# Patient Record
Sex: FEMALE | Race: WHITE | NOT HISPANIC OR LATINO | Employment: STUDENT | URBAN - METROPOLITAN AREA
[De-identification: names, ages, dates, MRNs, and addresses within clinical notes are randomized per-mention and may not be internally consistent; named-entity substitution may affect disease eponyms.]

---

## 2017-06-21 ENCOUNTER — TELEPHONE (OUTPATIENT)
Dept: DERMATOLOGY | Age: 22
End: 2017-06-21

## 2017-06-21 DIAGNOSIS — L70.0 ACNE VULGARIS: Primary | ICD-10-CM

## 2017-06-23 RX ORDER — CLINDAMYCIN PHOSPHATE 11.9 MG/ML
SOLUTION TOPICAL DAILY
Qty: 30 ML | Refills: 11 | Status: SHIPPED | OUTPATIENT
Start: 2017-06-23 | End: 2017-09-18 | Stop reason: ALTCHOICE

## 2017-09-18 ENCOUNTER — TELEPHONE (OUTPATIENT)
Dept: DERMATOLOGY | Age: 22
End: 2017-09-18

## 2017-09-18 DIAGNOSIS — L70.8 OTHER ACNE: ICD-10-CM

## 2017-09-18 RX ORDER — CLINDAMYCIN PHOSPHATE, BENZOYL PEROXIDE 25; 10 MG/G; MG/G
GEL TOPICAL
Qty: 45 G | Refills: 1 | Status: SHIPPED | OUTPATIENT
Start: 2017-09-18

## 2017-09-21 ENCOUNTER — TELEPHONE (OUTPATIENT)
Dept: DERMATOLOGY | Age: 22
End: 2017-09-21

## 2017-12-20 ENCOUNTER — OFFICE VISIT (OUTPATIENT)
Dept: OBGYN | Age: 22
End: 2017-12-20

## 2017-12-20 VITALS
WEIGHT: 109 LBS | SYSTOLIC BLOOD PRESSURE: 112 MMHG | BODY MASS INDEX: 19.31 KG/M2 | HEIGHT: 63 IN | DIASTOLIC BLOOD PRESSURE: 58 MMHG

## 2017-12-20 DIAGNOSIS — Z01.419 WELL WOMAN EXAM WITH ROUTINE GYNECOLOGICAL EXAM: Primary | ICD-10-CM

## 2017-12-20 DIAGNOSIS — Z12.4 PAP SMEAR FOR CERVICAL CANCER SCREENING: ICD-10-CM

## 2017-12-20 DIAGNOSIS — Z30.41 SURVEILLANCE OF PREVIOUSLY PRESCRIBED CONTRACEPTIVE PILL: ICD-10-CM

## 2017-12-20 DIAGNOSIS — Z11.3 SCREENING EXAMINATION FOR STD (SEXUALLY TRANSMITTED DISEASE): ICD-10-CM

## 2017-12-20 PROCEDURE — 88175 CYTOPATH C/V AUTO FLUID REDO: CPT | Performed by: PATHOLOGY

## 2017-12-20 PROCEDURE — 99395 PREV VISIT EST AGE 18-39: CPT | Performed by: OBSTETRICS & GYNECOLOGY

## 2017-12-20 PROCEDURE — 87491 CHLMYD TRACH DNA AMP PROBE: CPT | Performed by: INTERNAL MEDICINE

## 2017-12-20 PROCEDURE — 87624 HPV HI-RISK TYP POOLED RSLT: CPT | Performed by: PATHOLOGY

## 2017-12-20 PROCEDURE — 87591 N.GONORRHOEAE DNA AMP PROB: CPT | Performed by: INTERNAL MEDICINE

## 2017-12-20 RX ORDER — NORETHINDRONE ACETATE AND ETHINYL ESTRADIOL 1MG-20(21)
1 KIT ORAL DAILY
Qty: 84 TABLET | Refills: 4 | Status: SHIPPED | OUTPATIENT
Start: 2017-12-20

## 2017-12-21 ENCOUNTER — TELEPHONE (OUTPATIENT)
Dept: OBGYN | Age: 22
End: 2017-12-21

## 2017-12-21 LAB
C TRACH RRNA SPEC QL NAA+PROBE: NEGATIVE
N GONORRHOEA RRNA SPEC QL NAA+PROBE: NEGATIVE
SPECIMEN SOURCE: NORMAL

## 2017-12-28 ENCOUNTER — TELEPHONE (OUTPATIENT)
Dept: OBGYN | Age: 22
End: 2017-12-28

## 2017-12-28 LAB — CYTOLOGY CVX/VAG DOC THIN PREP: NORMAL

## 2018-02-27 ENCOUNTER — OFFICE VISIT (OUTPATIENT)
Dept: DERMATOLOGY | Facility: CLINIC | Age: 23
End: 2018-02-27
Payer: COMMERCIAL

## 2018-02-27 DIAGNOSIS — Z12.83 SKIN CANCER SCREENING: ICD-10-CM

## 2018-02-27 DIAGNOSIS — D22.9 MULTIPLE BENIGN NEVI: ICD-10-CM

## 2018-02-27 DIAGNOSIS — L70.0 ACNE VULGARIS: Primary | ICD-10-CM

## 2018-02-27 RX ORDER — TRETINOIN 0.5 MG/G
CREAM TOPICAL AT BEDTIME
COMMUNITY
End: 2018-02-27

## 2018-02-27 RX ORDER — CLINDAMYCIN PHOSPHATE, BENZOYL PEROXIDE 25; 10 MG/G; MG/G
GEL TOPICAL
COMMUNITY
End: 2018-02-27

## 2018-02-27 RX ORDER — CLINDAMYCIN PHOSPHATE, BENZOYL PEROXIDE 25; 10 MG/G; MG/G
GEL TOPICAL
Qty: 50 G | Refills: 1 | Status: SHIPPED | OUTPATIENT
Start: 2018-02-27 | End: 2020-03-30

## 2018-02-27 RX ORDER — TRETINOIN 0.5 MG/G
CREAM TOPICAL AT BEDTIME
Qty: 45 G | Refills: 1 | Status: SHIPPED | OUTPATIENT
Start: 2018-02-27 | End: 2020-03-30

## 2018-02-27 RX ORDER — NORETHINDRONE ACETATE AND ETHINYL ESTRADIOL AND FERROUS FUMARATE 5-7-9-7
1 KIT ORAL DAILY
COMMUNITY
End: 2023-05-26

## 2018-02-27 ASSESSMENT — PAIN SCALES - GENERAL: PAINLEVEL: NO PAIN (0)

## 2018-02-27 NOTE — LETTER
2/27/2018       RE: Bridgett Robin  [address]  [facility] [address]     Dear Colleague,    Thank you for referring your patient, Bridgett Robin, to the [facility] DERMATOLOGY at [facility]. Please see a copy of my visit note below.    [facility] Dermatology Note      Dermatology Problem List:  1.Acne Vulgaris - well controlled on tretinoin 0.05% cream QHS and clinda/BP gel QAM   -patient also on OCPs    CC:   Chief Complaint   Patient presents with     Acne     Bridgett is here today to be seen for acne.        Encounter Date: Feb 27, 2018    History of Present Illness:  Ms. Bridgett Robin is a 23 year old female who in new to us with no history of skin cancer presents for a skin check. She also needs her acne medication refilled. She has no lesions of concern. The patient denies painful, itching, tingling or bleeding lesions unless otherwise noted. Says he has fair skin and realizes she is at risk for skin cancer.    Concerning her acne she just needs refills, it is working very well to control her breakouts and she is happy with it. Using clindamycin/BP gel in the AM and tretinoin 0.05% in the PM daily. She states she uses sunscreen daily, as high of an SPF she can get because she burns so easily.     Past Medical History:   There is no problem list on file for this patient.    History reviewed. No pertinent past medical history.  History reviewed. No pertinent surgical history.    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 The patient denies use of tanning beds.    Family History:  There is no family history of melanoma.    Medications:  Current Outpatient Prescriptions   Medication Sig Dispense Refill     norethindrone-ethinyl estradiol-iron (ESTROSTEP FE) 1-20/1-30/1-35 MG-MCG per tablet Take 1 tablet by mouth daily       Clindamycin Phos-benzoyl Perox 1.2-2.5 % GEL        tretinoin  (RETIN-A) 0.05 % cream Apply topically At Bedtime       No Known Allergies      Review of Systems:  -As per HPI  -Constitutional: The patient denies fatigue, fevers, chills, unintended weight loss, and night sweats.  -Skin: As above in HPI. No additional skin concerns.    Physical exam:  Vitals: There were no vitals taken for this visit.  GEN: This is a well developed, well-nourished female in no acute distress, in a pleasant mood.    SKIN: Total skin excluding the undergarment areas was performed. The exam included the head/face, neck, both arms, chest, back, abdomen, both legs, digits and/or nails.   -Juan skin type II  -There are superifical acneiform papules with intermixed open and closed comedones on the chin and forehead, very mild, very well controlled on current medications.   Multiple regular brown pigmented macules and papules are identified on the trunka nd extremities, fewer than average number of nevi.   -No other lesions of concern on areas examined.     Impression/Plan:  1. Acne vulgaris- well controlled. She is also taking oral birth control pills which is also probably having a positive effect.    Tretinoin 0.05% cream refilled    Clindamycin Phos-Benzoyl Perox 1.2-2.5% gel refilled    Continue with adequate sun protection    2. Skin Cancer Screening    ABCDs of melanoma were discussed and self skin checks were advised. , Sun precaution was advised including the use of sun screens of SPF 30 or higher, sun protective clothing, and avoidance of tanning beds., We will clinically monitor this area.    3. Multiple clinically benign nevi on the trunk and extremities    ABCDs of melanoma were discussed and self skin checks were advised. , Sun precaution was advised including the use of sun screens of SPF 30 or higher, sun protective clothing, and avoidance of tanning beds., We will clinically monitor this area.    CC Dr. Herrera on close of this encounter.  Follow-up prn for new or changing lesions.         Staff Involved:  Staff Only  All risks, benefits and alternatives were discussed with patient.  Patient is in agreement and understands the assessment and plan.  All questions were answered.    Skylar Melendez PA-C  [facility] [place]: Phone: [phone], Fax: [phone]

## 2018-02-27 NOTE — MR AVS SNAPSHOT
After Visit Summary   2/27/2018    Bridgett Robin    MRN: [id number]           Patient Information     Date Of Birth          1995        Visit Information        Provider Department      2/27/2018 11:00 AM Skylar Melendez PA-C [facility] Dermatology        Today's Diagnoses     Acne vulgaris    -  1    Skin cancer screening        Multiple benign nevi           Follow-ups after your visit        Follow-up notes from your care team     Return in about 1 year (around 2/27/2019).      Who to contact     Please call your clinic at [phone] to:    Ask questions about your health    Make or cancel appointments    Discuss your medicines    Learn about your test results    Speak to your doctor            Additional Information About Your Visit        cityguruharBuyer[employer]S Information     Zipcar gives you secure access to your electronic health record. If you see a primary care provider, you can also send messages to your care team and make appointments. If you have questions, please call your primary care clinic.  If you do not have a primary care provider, please call [phone] and they will assist you.      Zipcar is an electronic gateway that provides easy, online access to your medical records. With [employer], you can request a clinic appointment, read your test results, renew a prescription or communicate with your care team.     To access your existing account, please contact your [facility] or call [phone] for assistance.        Care EveryWhere ID     This is your Care EveryWhere ID. This could be used by other organizations to access your [place] medical records  [id number]         Blood Pressure from Last 3 Encounters:   No data found for BP    Weight from Last 3 Encounters:   No data found for Wt              Today, you had the following     No orders found for display         Today's Medication Changes          These changes are accurate as of 2/27/18 11:23 AM.   If you have any questions, ask your nurse or doctor.               These medicines have changed or have updated prescriptions.        Dose/Directions    Clindamycin Phos-benzoyl Perox 1.2-2.5 % Gel   This may have changed:    - how to take this  - additional instructions   Used for:  Acne vulgaris   Changed by:  Skylar Melendez PA-C        Apply topically daily   Quantity:  50 g   Refills:  1            Where to get your medicines      These medications were sent to [facility] - [place], [place] - [address]  [address] , [facility] [address]     Phone:  [phone]     Clindamycin Phos-benzoyl Perox 1.2-2.5 % Gel    tretinoin 0.05 % cream                Primary Care Provider    None Specified       No primary provider on file.        Equal Access to Services     [facility] : Lior Blood, waaxda luqadaha, qaybta kaalmada magdalenayaparesh, savage sims . So [facility] [phone].    ATENCIÓN: Si habla español, tiene a pinedo disposición servicios gratuitos de asistencia lingüística. [facility] [phone].    We comply with applicable federal civil rights laws and [place] laws. We do not discriminate on the basis of race, color, national origin, age, disability, sex, sexual orientation, or gender identity.            Thank you!     Thank you for choosing [facility] DERMATOLOGY  for your care. Our goal is always to provide you with excellent care. Hearing back from our patients is one way we can continue to improve our services. Please take a few minutes to complete the written survey that you may receive in the mail after your visit with us. Thank you!             Your Updated Medication List - Protect others around you: Learn how to safely use, store and throw away your medicines at www.disposemymeds.org.          This list is accurate as of 2/27/18 11:23 AM.  Always use your most recent med list.                   Brand Name Dispense Instructions for use  Diagnosis    Clindamycin Phos-benzoyl Perox 1.2-2.5 % Gel     50 g    Apply topically daily    Acne vulgaris       norethindrone-ethinyl estradiol-iron 1-20/1-30/1-35 MG-MCG per tablet    ESTROSTEP FE     Take 1 tablet by mouth daily        tretinoin 0.05 % cream    RETIN-A    45 g    Apply topically At Bedtime    Acne vulgaris

## 2018-02-27 NOTE — NURSING NOTE
Dermatology Rooming Note    Bridgett Robin's goals for this visit include:   Chief Complaint   Patient presents with     Acne     Bridgett is here today to be seen for acne.      Michelle Lieberman MA

## 2018-02-27 NOTE — PROGRESS NOTES
[facility] Dermatology Note      Dermatology Problem List:  1.Acne Vulgaris - well controlled on tretinoin 0.05% cream QHS and clinda/BP gel QAM   -patient also on OCPs    CC:   Chief Complaint   Patient presents with     Acne     Bridgett is here today to be seen for acne.        Encounter Date: Feb 27, 2018    History of Present Illness:  Ms. Bridgett Robin is a 23 year old female who in new to us with no history of skin cancer presents for a skin check. She also needs her acne medication refilled. She has no lesions of concern. The patient denies painful, itching, tingling or bleeding lesions unless otherwise noted. Says he has fair skin and realizes she is at risk for skin cancer.    Concerning her acne she just needs refills, it is working very well to control her breakouts and she is happy with it. Using clindamycin/BP gel in the AM and tretinoin 0.05% in the PM daily. She states she uses sunscreen daily, as high of an SPF she can get because she burns so easily.     Past Medical History:   There is no problem list on file for this patient.    History reviewed. No pertinent past medical history.  History reviewed. No pertinent surgical history.    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 The patient denies use of tanning beds.    Family History:  There is no family history of melanoma.    Medications:  Current Outpatient Prescriptions   Medication Sig Dispense Refill     norethindrone-ethinyl estradiol-iron (ESTROSTEP FE) 1-20/1-30/1-35 MG-MCG per tablet Take 1 tablet by mouth daily       Clindamycin Phos-benzoyl Perox 1.2-2.5 % GEL        tretinoin (RETIN-A) 0.05 % cream Apply topically At Bedtime       No Known Allergies      Review of Systems:  -As per HPI  -Constitutional: The patient denies fatigue, fevers, chills, unintended weight loss, and night sweats.  -Skin: As above in HPI. No additional skin  concerns.    Physical exam:  Vitals: There were no vitals taken for this visit.  GEN: This is a well developed, well-nourished female in no acute distress, in a pleasant mood.    SKIN: Total skin excluding the undergarment areas was performed. The exam included the head/face, neck, both arms, chest, back, abdomen, both legs, digits and/or nails.   -Juan skin type II  -There are superifical acneiform papules with intermixed open and closed comedones on the chin and forehead, very mild, very well controlled on current medications.   Multiple regular brown pigmented macules and papules are identified on the trunka nd extremities, fewer than average number of nevi.   -No other lesions of concern on areas examined.     Impression/Plan:  1. Acne vulgaris- well controlled. She is also taking oral birth control pills which is also probably having a positive effect.    Tretinoin 0.05% cream refilled    Clindamycin Phos-Benzoyl Perox 1.2-2.5% gel refilled    Continue with adequate sun protection    2. Skin Cancer Screening    ABCDs of melanoma were discussed and self skin checks were advised. , Sun precaution was advised including the use of sun screens of SPF 30 or higher, sun protective clothing, and avoidance of tanning beds., We will clinically monitor this area.    3. Multiple clinically benign nevi on the trunk and extremities    ABCDs of melanoma were discussed and self skin checks were advised. , Sun precaution was advised including the use of sun screens of SPF 30 or higher, sun protective clothing, and avoidance of tanning beds., We will clinically monitor this area.    CC Dr. Herrera on close of this encounter.  Follow-up prn for new or changing lesions.        Staff Involved:  Staff Only  All risks, benefits and alternatives were discussed with patient.  Patient is in agreement and understands the assessment and plan.  All questions were answered.    Skylar Melendez PA-C  [facility]  [facility]: Phone: [phone], Fax: [phone]

## 2018-03-05 ENCOUNTER — HEALTH MAINTENANCE LETTER (OUTPATIENT)
Age: 23
End: 2018-03-05

## 2019-03-01 ENCOUNTER — TELEPHONE (OUTPATIENT)
Dept: OBGYN | Age: 24
End: 2019-03-01

## 2019-03-05 DIAGNOSIS — Z30.41 SURVEILLANCE OF PREVIOUSLY PRESCRIBED CONTRACEPTIVE PILL: ICD-10-CM

## 2019-03-05 RX ORDER — NORETHINDRONE ACETATE AND ETHINYL ESTRADIOL AND FERROUS FUMARATE 1MG-20(21)
KIT ORAL
Qty: 84 TABLET | Refills: 4 | OUTPATIENT
Start: 2019-03-05

## 2019-03-11 ENCOUNTER — TELEPHONE (OUTPATIENT)
Dept: OBGYN | Age: 24
End: 2019-03-11

## 2020-03-11 ENCOUNTER — HEALTH MAINTENANCE LETTER (OUTPATIENT)
Age: 25
End: 2020-03-11

## 2020-03-30 ENCOUNTER — VIRTUAL VISIT (OUTPATIENT)
Dept: DERMATOLOGY | Facility: CLINIC | Age: 25
End: 2020-03-30
Payer: COMMERCIAL

## 2020-03-30 DIAGNOSIS — L70.0 ACNE VULGARIS: ICD-10-CM

## 2020-03-30 RX ORDER — TRETINOIN 0.5 MG/G
CREAM TOPICAL AT BEDTIME
Qty: 45 G | Refills: 1 | Status: SHIPPED | OUTPATIENT
Start: 2020-03-30 | End: 2023-06-22

## 2020-03-30 RX ORDER — CLINDAMYCIN PHOSPHATE, BENZOYL PEROXIDE 25; 10 MG/G; MG/G
GEL TOPICAL
Qty: 50 G | Refills: 1 | Status: SHIPPED | OUTPATIENT
Start: 2020-03-30 | End: 2020-08-25

## 2020-03-30 ASSESSMENT — PAIN SCALES - GENERAL: PAINLEVEL: NO PAIN (0)

## 2020-03-30 NOTE — PATIENT INSTRUCTIONS
[facility] Teledermatology Visit    Thank you for allowing us to participate in your care. Your findings are consistent with acne. Your instructions are as follows:  1. Continue with current acne regimen - tretinoin and Acayna gel.   2. Please use proper sun protection - at least SPF 30+    Your follow-up instructions are as follows:  1. Follow-up with dermatology as needed              When should I call my doctor?    If you are worsening or not improving, please, contact us or seek urgent care as noted below.     Who should I call with questions?    [facility]: [phone]     [facility]: [phone]    If this is a medical emergency and you are unable to reach an ER, Call 609

## 2020-03-30 NOTE — PROGRESS NOTES
[facility]      Impression and Recommendations:  1. Acne vulgaris- well controlled. With mild hormonal flares. On OCP.    Tretinoin 0.05% cream refilled    Clindamycin Phos-Benzoyl Perox 1.2-2.5% gel refilled    Discussed effect of OCP. She could consider taking it continuously to mitigate the hormonal flares.    Briefly discussed isotretinoin and spironolactone as alternative options.    Continue with adequate sun protection    Follow-up:   PRN, sooner for new or changing lesions    Staff:    All risks, benefits and alternatives were discussed with patient.  Patient is in agreement and understands the assessment and plan.  All questions were answered.    Skylar Melendez PA-C, MPAS  [facility] [place]: Phone: [phone], Fax: [phone]  [facility]: Phone: [phone],  Fax: [phone]    _____________________________________________________________________________    Dermatology Problem List:  1.Acne Vulgaris - well controlled on tretinoin 0.05% cream QHS and clinda/BP gel QAM              -patient also on OCPs    Encounter Date: Mar 30, 2020    CC:   Chief Complaint   Patient presents with     Acne     Bridgett is following up on acne.        History of Present Illness:  Bridgett Robin is a 25 year old female who presents as a teledermatology consult for the following:    She was last seen by myself approximately 2 years ago. She was continued on tretinoin 0.05% cream and Acanya (clindamcyin/BPO) 1.2-2.5% gel. This regimen works very well to control her acne. She states she is relatively strict with it an does notice her acne flare if she runs out of the medication or stops using it. She is also on an OCP. She takes this as prescribed. She noticed a few small hormonal related breakouts before she gets her period each month. She is otherwise doing well and has no other concerns.     ROS:  Constitutional: no  fevers, night sweats or unintentional weight change   Integumentary: no concerning lesions or moles   Psych: no depression or anxiety, no sleep problems    Past Medical History:   There is no problem list on file for this patient.    No past medical history on file.  No past surgical history on file.    Social History:  Social History     Socioeconomic History     Marital status: Single     Spouse name: None     Number of children: None     Years of education: None     Highest education level: None   Occupational History     None   Social Needs     Financial resource strain: None     Food insecurity     Worry: None     Inability: None     Transportation needs     Medical: None     Non-medical: None   Tobacco Use     Smoking status: Never Smoker     Smokeless tobacco: Never Used   Substance and Sexual Activity     Alcohol use: None     Drug use: None     Sexual activity: None   Lifestyle     Physical activity     Days per week: None     Minutes per session: None     Stress: None   Relationships     Social connections     Talks on phone: None     Gets together: None     Attends Jewish service: None     Active member of club or organization: None     Attends meetings of clubs or organizations: None     Relationship status: None     Intimate partner violence     Fear of current or ex partner: None     Emotionally abused: None     Physically abused: None     Forced sexual activity: None   Other Topics Concern     Parent/sibling w/ CABG, MI or angioplasty before 65F 55M? Not Asked   Social History Narrative     None       Family History:  No family history on file.    Medications:  Current Outpatient Medications   Medication     Clindamycin Phos-benzoyl Perox 1.2-2.5 % GEL     norethindrone-ethinyl estradiol-iron (ESTROSTEP FE) 1-20/1-30/1-35 MG-MCG per tablet     tretinoin (RETIN-A) 0.05 % cream     No current facility-administered medications for this visit.           No Known  Allergies    _____________________________________________________________________________    Teledermatology information:  - Location of patient: Home  - Location of teledermatology provider: Skylar Melendez PA-C ([address] [place] [address])  - Reason teledermatology is appropriate: of National Emergency Regarding Coronavirus disease (COVID 19) Outbreak  - The patient's condition can safely be assessed using telemedicine: yes  - Method of transmission: store and forward teledermatology  - Service start time:3:20pm  - Service end time:3:27pm  - Date of report: 03/30/20

## 2020-03-30 NOTE — PROGRESS NOTES
Dermatology Rooming Note    Bridgett Robin's goals for this visit include:   Chief Complaint   Patient presents with     Acne     Bridgett is following up on acne.      AUBREE Hobbs

## 2020-06-03 ENCOUNTER — TELEPHONE (OUTPATIENT)
Dept: DERMATOLOGY | Facility: CLINIC | Age: 25
End: 2020-06-03

## 2020-06-03 NOTE — TELEPHONE ENCOUNTER
Prior Authorization Retail Medication Request    Medication/Dose: Acanya  ICD code (if different than what is on RX): L70.0   Previously Tried and Failed:    Rationale:      Insurance Name: [employer]  Insurance ID: [id number]       Pharmacy Information (if different than what is on RX)  Name:    Phone:

## 2020-08-25 ENCOUNTER — MYC REFILL (OUTPATIENT)
Dept: DERMATOLOGY | Facility: CLINIC | Age: 25
End: 2020-08-25

## 2020-08-25 DIAGNOSIS — L70.0 ACNE VULGARIS: ICD-10-CM

## 2020-08-25 RX ORDER — CLINDAMYCIN PHOSPHATE, BENZOYL PEROXIDE 25; 10 MG/G; MG/G
GEL TOPICAL
Qty: 50 G | Refills: 1 | Status: SHIPPED | OUTPATIENT
Start: 2020-08-25 | End: 2023-06-22

## 2020-08-25 NOTE — TELEPHONE ENCOUNTER
clindamycin phos-benzoyl perox (ACANYA) 1.2-2.5 % external gel    Last Written Prescription Date:  3/30/20  Last Fill Quantity: 50g,   # refills: 1  Last Office Visit :3/30/20  Future Office visit: none    Process 4

## 2020-08-26 ENCOUNTER — TELEPHONE (OUTPATIENT)
Dept: DERMATOLOGY | Facility: CLINIC | Age: 25
End: 2020-08-26

## 2020-08-26 NOTE — TELEPHONE ENCOUNTER
Central Prior Authorization Team   Phone: [phone]      PA Initiation    Medication: clindamycin phos-benzoyl perox (ACANYA) 1.2-2.5 % external gel   Insurance Company: [employer] ([facility]) - Phone [phone] Fax [phone]  Pharmacy Filling the Rx: [id number] - [place], [place] - [address]   Filling Pharmacy Phone: [phone]  Filling Pharmacy Fax:    Start Date: 8/26/2020

## 2020-08-26 NOTE — TELEPHONE ENCOUNTER
Prior Authorization Retail Medication Request    Medication/Dose: Clinda ph- Benzoyl tapan 1.2-2.5%  ICD code (if different than what is on RX): L70.0  Previously Tried and Failed:    Rationale:      Insurance Name: [employer]  Insurance ID: [id number]      Pharmacy Information (if different than what is on RX)  Name:    Phone:

## 2020-08-27 NOTE — TELEPHONE ENCOUNTER
Actually unable to complete the P/A request on Cover My Meds, as I get the message back that they cannot find match for patient.      PA Initiation - Called insurance, completed P/A request over the phone with rep Sharma.   Ref# [id number]. It is pending clinical review. Turn-around time is 14 days    Note: Preferred Alternatives are - Onexton (no P/A required), Epiduo Forte (no P/A required), and Epiduo (does require a P/A)    Medication: clindamycin phos-benzoyl perox (ACANYA) 1.2-2.5 % external gel   Insurance Company: [employer] ([facility]) - Phone [phone] Fax [phone]  Pharm[facility]illing the Rx[id number] [place] [facility] [place], [place] - [address] [place] [address]  Filling Pharmacy Phone: [phone]  Filling Pharmacy Fax:    Start Date: 8/26/2020

## 2020-08-31 NOTE — TELEPHONE ENCOUNTER
Prior Authorization Approval    Authorization Effective Date: 8/27/2020  Authorization Expiration Date: 8/27/2021  Medication: clindamycin phos-beNzoyl perox (ACANYA) 1.2-2.5 % external gel   Approved Dose/Quantity:  Reference #: [id number]   Insurance Company: [employer] ([facility]) - Phone [phone] Fax [phone]  Which Pharmacy is filling the prescription (Not needed for infusion/clinic administered): [id number] [place] [facility] [place], [place] - [address] [place] [address]  Pharmacy Notified: Yes - via voicemail  Patient Notified:

## 2020-09-08 ENCOUNTER — TELEPHONE (OUTPATIENT)
Dept: DERMATOLOGY | Facility: CLINIC | Age: 25
End: 2020-09-08

## 2020-09-08 NOTE — TELEPHONE ENCOUNTER
[facility] Call Center    Phone Message    May a detailed message be left on voicemail: yes     Reason for Call: Medication Question or concern regarding medication   Prescription Clarification  Name of Medication: clindamycin phos-benzoyl perox (ACANYA) 1.2-2.5 % external gel   Prescribing Provider: Skylar Melendez   Pharmacy: [facility] [id number] [place], [place] - [address]     What on the order needs clarification? Pt called and said that this medication cost her $400 and was wondering if there is an alternative.  Please send her a message via [employer] to discuss. Thanks          Action Taken: Message routed to:  [facility] ([facility] DERM    Travel Screening: Not Applicable

## 2021-01-03 ENCOUNTER — HEALTH MAINTENANCE LETTER (OUTPATIENT)
Age: 26
End: 2021-01-03

## 2021-04-25 ENCOUNTER — HEALTH MAINTENANCE LETTER (OUTPATIENT)
Age: 26
End: 2021-04-25

## 2021-10-10 ENCOUNTER — HEALTH MAINTENANCE LETTER (OUTPATIENT)
Age: 26
End: 2021-10-10

## 2022-05-21 ENCOUNTER — HEALTH MAINTENANCE LETTER (OUTPATIENT)
Age: 27
End: 2022-05-21

## 2022-09-18 ENCOUNTER — HEALTH MAINTENANCE LETTER (OUTPATIENT)
Age: 27
End: 2022-09-18

## 2023-05-26 ENCOUNTER — OFFICE VISIT (OUTPATIENT)
Dept: MIDWIFE SERVICES | Facility: CLINIC | Age: 28
End: 2023-05-26
Payer: COMMERCIAL

## 2023-05-26 VITALS
SYSTOLIC BLOOD PRESSURE: 118 MMHG | BODY MASS INDEX: 21.62 KG/M2 | OXYGEN SATURATION: 98 % | HEIGHT: 63 IN | HEART RATE: 94 BPM | WEIGHT: 122 LBS | DIASTOLIC BLOOD PRESSURE: 76 MMHG

## 2023-05-26 DIAGNOSIS — Z11.3 ROUTINE SCREENING FOR STI (SEXUALLY TRANSMITTED INFECTION): ICD-10-CM

## 2023-05-26 DIAGNOSIS — Z12.4 SCREENING FOR CERVICAL CANCER: Primary | ICD-10-CM

## 2023-05-26 PROCEDURE — 36415 COLL VENOUS BLD VENIPUNCTURE: CPT | Performed by: ADVANCED PRACTICE MIDWIFE

## 2023-05-26 PROCEDURE — 86780 TREPONEMA PALLIDUM: CPT | Performed by: ADVANCED PRACTICE MIDWIFE

## 2023-05-26 PROCEDURE — 86803 HEPATITIS C AB TEST: CPT | Performed by: ADVANCED PRACTICE MIDWIFE

## 2023-05-26 PROCEDURE — 87591 N.GONORRHOEAE DNA AMP PROB: CPT | Performed by: ADVANCED PRACTICE MIDWIFE

## 2023-05-26 PROCEDURE — 87389 HIV-1 AG W/HIV-1&-2 AB AG IA: CPT | Performed by: ADVANCED PRACTICE MIDWIFE

## 2023-05-26 PROCEDURE — 87491 CHLMYD TRACH DNA AMP PROBE: CPT | Performed by: ADVANCED PRACTICE MIDWIFE

## 2023-05-26 PROCEDURE — G0145 SCR C/V CYTO,THINLAYER,RESCR: HCPCS | Performed by: ADVANCED PRACTICE MIDWIFE

## 2023-05-26 PROCEDURE — 99203 OFFICE O/P NEW LOW 30 MIN: CPT | Performed by: ADVANCED PRACTICE MIDWIFE

## 2023-05-26 RX ORDER — TRETINOIN 1 MG/G
CREAM TOPICAL AT BEDTIME
COMMUNITY
End: 2023-06-22 | Stop reason: DRUGHIGH

## 2023-05-26 RX ORDER — SPIRONOLACTONE 50 MG/1
100 TABLET, FILM COATED ORAL DAILY
COMMUNITY
End: 2023-06-22 | Stop reason: DRUGHIGH

## 2023-05-26 NOTE — PROGRESS NOTES
Bridgett is a 28 year old No obstetric history on file. female who presents for annual exam.     Menses are regular q 28-30 days and normal lasting 5-6 days.  Menses flow: normal.  Patient's last menstrual period was 05/20/2023.. Using none for contraception.  She is not currently considering pregnancy.  Besides routine health maintenance, she has no other health concerns today .  GYNECOLOGIC HISTORY:  Menarche: 14  Age at first intercourse: 18 Number of lifetime partners: more than 6  Bridgett is sexually active with a male partner(s) and is currently in monogamous relationship with friend.    History sexually transmitted infections:No STD history  STI testing offered?  Accepted  VALARIE exposure: No  History of abnormal Pap smear: YES - updated in Problem List and Health Maintenance accordingly  Family history of breast CA: No  Family history of uterine/ovarian CA: Yes (Please explain): uterine p grandmother    Family history of colon CA: No    HEALTH MAINTENANCE:  Cholesterol: (No results found for: CHOL History of abnormal lipids: No  Mammo:  . History of abnormal Mammo: Not applicable.  Regular Self Breast Exams: Yes  Calcium/Vitamin D intake: source:  dairy, dietary supplement(s) Adequate? Yes  TSH: (No results found for: TSH )  Pap; (No results found for: PAP )    HISTORY:  OB History   No obstetric history on file.     No past medical history on file.  No past surgical history on file.  History reviewed. No pertinent family history.  Social History     Socioeconomic History     Marital status: Single     Spouse name: None     Number of children: None     Years of education: None     Highest education level: None   Tobacco Use     Smoking status: Never     Smokeless tobacco: Never   Substance and Sexual Activity     Alcohol use: Yes     Drug use: Yes     Types: Marijuana     Sexual activity: Yes     Partners: Male       Current Outpatient Medications:      spironolactone (ALDACTONE) 50 MG tablet, Take 100 mg by mouth  "daily, Disp: , Rfl:      tretinoin (RETIN-A) 0.1 % external cream, Apply topically At Bedtime, Disp: , Rfl:      clindamycin phos-benzoyl perox (ACANYA) 1.2-2.5 % external gel, Apply topically daily, Disp: 50 g, Rfl: 1     norethindrone-ethinyl estradiol-iron (ESTROSTEP FE) 1-20/1-30/1-35 MG-MCG per tablet, Take 1 tablet by mouth daily, Disp: , Rfl:      tretinoin (RETIN-A) 0.05 % external cream, Apply topically At Bedtime (Patient not taking: Reported on 5/26/2023), Disp: 45 g, Rfl: 1   No Known Allergies    Past medical, surgical, social and family history were reviewed and updated in EPIC.    ROS:   C:     NEGATIVE for fever, chills, change in weight  I:       NEGATIVE for worrisome rashes, moles or lesions  E:     NEGATIVE for vision changes or irritation  E/M: NEGATIVE for ear, mouth and throat problems  R:     NEGATIVE for significant cough or SOB  CV:   NEGATIVE for chest pain, palpitations or peripheral edema  GI:     NEGATIVE for nausea, abdominal pain, heartburn, or change in bowel habits  :   NEGATIVE for frequency, dysuria, hematuria, vaginal discharge, or irregular bleeding  M:     NEGATIVE for significant arthralgias or myalgia  N:      NEGATIVE for weakness, dizziness or paresthesias  E:      NEGATIVE for temperature intolerance, skin/hair changes  P:      NEGATIVE for changes in mood or affect.    EXAM:  /76   Pulse 94   Ht 1.6 m (5' 3\")   Wt 55.3 kg (122 lb)   LMP 05/20/2023   SpO2 98%   BMI 21.61 kg/m     BMI: Body mass index is 21.61 kg/m .  Constitutional: healthy, alert and no distress  Head: Normocephalic. No masses, lesions, tenderness or abnormalities  Neck: Neck supple. Trachea midline. No adenopathy. Thyroid symmetric, normal size.   Cardiovascular: RRR.   Respiratory: Negative.   Breast: {GYN Breast:238101}  Gastrointestinal: Abdomen soft, non-tender, non-distended. No masses, organomegaly.  :  Vulva:  No external lesions, normal female hair distribution, no inguinal " "adenopathy.    Urethra:  Midline, non-tender, well supported, no discharge  Vagina:  Moist, pink, no abnormal discharge, no lesions  Uterus:  Normal size, *** , non-tender, freely mobile  Ovaries:  No masses appreciated, non-tender, mobile  Rectal Exam: {RECTAL EXAM:701126::\"deferred\"}  Musculoskeletal: extremities normal  Skin: no suspicious lesions or rashes  Psychiatric: Affect appropriate, cooperative,mentation appears normal.     COUNSELING:   {FEMALE COUNSELING MESSAGES:952893::\"Reviewed preventive health counseling, as reflected in patient instructions\"}   reports that she has never smoked. She has never used smokeless tobacco.  {Tobacco Cessation -- Delete if patient is a non-smoker:321019}  Body mass index is 21.61 kg/m .  {Obesity Action Plan -- Delete if BMA < 25:853399}  FRAX Risk Assessment    ASSESSMENT:  28 year old female with satisfactory annual exam  {DIAG PICKLIST:926886}    "

## 2023-05-26 NOTE — PROGRESS NOTES
"Bridgett Robin presents to the clinic for a pap smear and STI testing. She has a preventative health appointment scheduled next month. She reports that she is not currently sexually active but has not been tested since she has been. She reports a normal regular period and has no concerns. Planning to use condoms for contraception     No past medical history on file.    Review Of Systems  Skin: Has lump on foot- sees derm  Eyes: negative  Ears/Nose/Throat: negative  Respiratory: No shortness of breath, dyspnea on exertion, cough, or hemoptysis  Cardiovascular: negative  Gastrointestinal: negative  Genitourinary: negative  Musculoskeletal: negative  Neurologic: negative  Psychiatric: negative  Hematologic/Lymphatic/Immunologic: negative  Endocrine: negative      O: /76   Pulse 94   Ht 1.6 m (5' 3\")   Wt 55.3 kg (122 lb)   LMP 05/20/2023   SpO2 98%   BMI 21.61 kg/m     General: well appearing, in NAD  Cardiac: well perfused  Respiratory: non-labored breathing on room air   Psych: alert and oriented     PELVIC EXAM:  Vulva: BUS WNL, no lesions noted,   Vagina: Discharge normal and physiologic, no lesions noted,   Cervix: smooth, pink, no visible lesions, neg CMT  Uterus: Normal size and position, non-tender, mobile   Ovaries: No mass, non-tender, mobile  Rectal exam: deferred    A/P:    (Z11.3) Routine screening for STI (sexually transmitted infection)  (primary encounter diagnosis)  Comment:   Plan: NEISSERIA GONORRHOEA PCR, CHLAMYDIA TRACHOMATIS        PCR, Hepatitis C antibody, HIV Antigen Antibody        Combo, Treponema Abs w Reflex to RPR and Titer            (Z12.4) Screening for cervical cancer  Comment:   Plan: Pap screen reflex to HPV if ASCUS - recommend         age 25 - 29            Return to the clinic in one year for your next annual appointment or sooner with concerns.    Venessa Teran CNM        "

## 2023-05-27 LAB
C TRACH DNA SPEC QL NAA+PROBE: NEGATIVE
HCV AB SERPL QL IA: NONREACTIVE
HIV 1+2 AB+HIV1 P24 AG SERPL QL IA: NONREACTIVE
N GONORRHOEA DNA SPEC QL NAA+PROBE: NEGATIVE
T PALLIDUM AB SER QL: NONREACTIVE

## 2023-05-28 NOTE — RESULT ENCOUNTER NOTE
Dear Bridgett,    Your test results are attached below. Your tests have all negative/ normal results. We are still waiting for your pap smear result and it normally takes 1 to 2 weeks to result.  If you have any questions, please contact me via [facility] or you can call our office at [phone].    Venessa Heranndez CNM, APRN PAVAN, SAUDM

## 2023-05-31 LAB
BKR LAB AP GYN ADEQUACY: NORMAL
BKR LAB AP GYN INTERPRETATION: NORMAL
BKR LAB AP HPV REFLEX: NORMAL
BKR LAB AP LMP: NORMAL
BKR LAB AP PREVIOUS ABNORMAL: NORMAL
PATH REPORT.COMMENTS IMP SPEC: NORMAL
PATH REPORT.COMMENTS IMP SPEC: NORMAL
PATH REPORT.RELEVANT HX SPEC: NORMAL

## 2023-06-04 ENCOUNTER — HEALTH MAINTENANCE LETTER (OUTPATIENT)
Age: 28
End: 2023-06-04

## 2023-06-22 ENCOUNTER — VIRTUAL VISIT (OUTPATIENT)
Dept: FAMILY MEDICINE | Facility: CLINIC | Age: 28
End: 2023-06-22
Payer: COMMERCIAL

## 2023-06-22 DIAGNOSIS — L70.0 ACNE VULGARIS: Primary | ICD-10-CM

## 2023-06-22 PROCEDURE — 99203 OFFICE O/P NEW LOW 30 MIN: CPT | Mod: 93 | Performed by: PHYSICIAN ASSISTANT

## 2023-06-22 RX ORDER — SPIRONOLACTONE 100 MG/1
100 TABLET, FILM COATED ORAL DAILY
Qty: 90 TABLET | Refills: 3 | Status: SHIPPED | OUTPATIENT
Start: 2023-06-22 | End: 2024-04-24

## 2023-06-22 RX ORDER — TRETINOIN 0.5 MG/G
CREAM TOPICAL AT BEDTIME
Qty: 45 G | Refills: 1 | Status: SHIPPED | OUTPATIENT
Start: 2023-06-22 | End: 2024-04-24

## 2023-06-22 NOTE — PROGRESS NOTES
[facility] Dermatology Note  Encounter Date: Jun 22, 2023  Store and Forward Visit - contact phone #: [phone]  Length of call: 7min    Dermatology Problem List:  1. Acne, hormonal component  Previous: OCPs, tretinoin 0.1% cream, spironolactone 150mg  Current: spironolactone 100mg daily, tretinoin 0.05% cream    Social:  at [facility]  ____________________________________________    Assessment & Plan:  # Acne vulgaris- well controlled on current plan. With mild hormonal flares.   - Continue spironolactone 100mg daily, refilled today  - will decrease strength of tretinoin to 0.05% and plan to use more frequently as 0.1% was too strong for her and causing irritation which limited her ability to use it often  - Sunscreen: Apply 20 minutes prior to going outdoors and reapply every two hours, when wet or sweating. We recommend using an SPF 30 or higher, and to use one that is water resistant.   - edu on risk of pregnancy, do not continue medication if planning to become pregnant. Currently has IUD.      Procedures Performed:   none    Follow-up: 1 year(s) in-person, or earlier for new or changing lesions    Staff:     All risks, benefits and alternatives were discussed with patient.  Patient is in agreement and understands the assessment and plan.  All questions were answered.    Skylar Melendez PA-C, MPAS  [facility]: Phone: [phone], Fax: [phone]  [facility]: Phone: [phone],  Fax: [phone]  [facility]: Phone: [phone], Fax: [phone]  ____________________________________________    CC: Acne (Routine acne follow up, some redness and bumps around mouth and nose)    HPI:  Ms. Bridgett Robin is a 28 year old female who presents today as a new patient for acne. Last seen by myself over 3 years ago for acne. Since then she has started spironolactone and it is  working well for her. Her acne really flared after stopping OCP and switching to an IUD. Also using tretinoin, but wondering about changing strengths. Tolerating this plan really well without side effects.     Patient is otherwise feeling well, without additional concerns.    Labs:  none    Physical Exam:  Vitals: LMP 05/20/2023   SKIN: Focused examination of unknown (cannot tell where photo was taken) - possibly neck? Photo from 6/20/23  - pink papule x1   - No other lesions of concern on areas examined.     Medications:  Current Outpatient Medications   Medication     spironolactone (ALDACTONE) 50 MG tablet     tretinoin (RETIN-A) 0.1 % external cream     clindamycin phos-benzoyl perox (ACANYA) 1.2-2.5 % external gel     tretinoin (RETIN-A) 0.05 % external cream     No current facility-administered medications for this visit.      Past Medical/Surgical History:   There is no problem list on file for this patient.    No past medical history on file.

## 2023-06-22 NOTE — LETTER
6/22/2023         RE: Bridgett Robin  [address] [facility] [address]        Dear Colleague,    Thank you for referring your patient, Bridgett Robin, to the [facility] [place]. Please see a copy of my visit note below.    [facility] Dermatology Note  Encounter Date: Jun 22, 2023  Store and Forward Visit - contact phone #: [phone]  Length of call: 7min    Dermatology Problem List:  1. Acne, hormonal component  Previous: OCPs, tretinoin 0.1% cream, spironolactone 150mg  Current: spironolactone 100mg daily, tretinoin 0.05% cream    Social:  at [employer]  ____________________________________________    Assessment & Plan:  # Acne vulgaris- well controlled on current plan. With mild hormonal flares.   - Continue spironolactone 100mg daily, refilled today  - will decrease strength of tretinoin to 0.05% and plan to use more frequently as 0.1% was too strong for her and causing irritation which limited her ability to use it often  - Sunscreen: Apply 20 minutes prior to going outdoors and reapply every two hours, when wet or sweating. We recommend using an SPF 30 or higher, and to use one that is water resistant.   - edu on risk of pregnancy, do not continue medication if planning to become pregnant. Currently has IUD.      Procedures Performed:   none    Follow-up: 1 year(s) in-person, or earlier for new or changing lesions    Staff:     All risks, benefits and alternatives were discussed with patient.  Patient is in agreement and understands the assessment and plan.  All questions were answered.    Skylar Melendez PA-C, MPAS  [facility]: Phone: [phone], Fax: [phone]  [facility]: Phone: [phone],  Fax: [phone]  [facility] [place]: Phone: [phone], Fax: [phone]  ____________________________________________    CC: Acne (Routine  acne follow up, some redness and bumps around mouth and nose)    HPI:  Ms. Bridgett Robin is a 28 year old female who presents today as a new patient for acne. Last seen by myself over 3 years ago for acne. Since then she has started spironolactone and it is working well for her. Her acne really flared after stopping OCP and switching to an IUD. Also using tretinoin, but wondering about changing strengths. Tolerating this plan really well without side effects.     Patient is otherwise feeling well, without additional concerns.    Labs:  none    Physical Exam:  Vitals: LMP 05/20/2023   SKIN: Focused examination of unknown (cannot tell where photo was taken) - possibly neck? Photo from 6/20/23  - pink papule x1   - No other lesions of concern on areas examined.     Medications:  Current Outpatient Medications   Medication     spironolactone (ALDACTONE) 50 MG tablet     tretinoin (RETIN-A) 0.1 % external cream     clindamycin phos-benzoyl perox (ACANYA) 1.2-2.5 % external gel     tretinoin (RETIN-A) 0.05 % external cream     No current facility-administered medications for this visit.      Past Medical/Surgical History:   There is no problem list on file for this patient.    No past medical history on file.                       Again, thank you for allowing me to participate in the care of your patient.        Sincerely,        Skylar Melendez PA-C

## 2024-04-24 ENCOUNTER — OFFICE VISIT (OUTPATIENT)
Dept: FAMILY MEDICINE | Facility: CLINIC | Age: 29
End: 2024-04-24
Payer: COMMERCIAL

## 2024-04-24 DIAGNOSIS — L82.1 SEBORRHEIC KERATOSES: ICD-10-CM

## 2024-04-24 DIAGNOSIS — D22.9 MULTIPLE BENIGN NEVI: Primary | ICD-10-CM

## 2024-04-24 DIAGNOSIS — B07.0 VERRUCA PLANTARIS: ICD-10-CM

## 2024-04-24 DIAGNOSIS — L81.4 LENTIGINES: ICD-10-CM

## 2024-04-24 DIAGNOSIS — L70.0 ACNE VULGARIS: ICD-10-CM

## 2024-04-24 DIAGNOSIS — Z12.83 ENCOUNTER FOR SCREENING FOR MALIGNANT NEOPLASM OF SKIN: ICD-10-CM

## 2024-04-24 DIAGNOSIS — D18.01 CHERRY ANGIOMA: ICD-10-CM

## 2024-04-24 PROCEDURE — 99213 OFFICE O/P EST LOW 20 MIN: CPT | Performed by: NURSE PRACTITIONER

## 2024-04-24 RX ORDER — SPIRONOLACTONE 50 MG/1
100 TABLET, FILM COATED ORAL DAILY
Qty: 180 TABLET | Refills: 3 | Status: SHIPPED | OUTPATIENT
Start: 2024-04-24 | End: 2024-05-15

## 2024-04-24 RX ORDER — ESCITALOPRAM OXALATE 5 MG/1
TABLET ORAL
COMMUNITY
Start: 2024-04-11

## 2024-04-24 RX ORDER — TRETINOIN 0.5 MG/G
CREAM TOPICAL AT BEDTIME
Qty: 45 G | Refills: 1 | Status: SHIPPED | OUTPATIENT
Start: 2024-04-24 | End: 2024-04-24

## 2024-04-24 RX ORDER — TRETINOIN 0.5 MG/G
CREAM TOPICAL AT BEDTIME
Qty: 45 G | Refills: 11 | Status: SHIPPED | OUTPATIENT
Start: 2024-04-24

## 2024-04-24 NOTE — PROGRESS NOTES
[facility] Dermatology Note  Encounter Date: Apr 24, 2024  Office Visit     Reviewed patients past medical history and pertinent chart review prior to patients visit today.     Dermatology Problem List:  Acne, well-controlled using spironolactone 100 mg daily and tretinoin 0.05% cream nightly  She had a mole removed from her back in high school, states this had normal pathology    Patient denies personal history of skin cancer or dysplastic nevi.    Grandmother had skin cancer in her face, type unknown.  No known family history of melanoma  ____________________________________________    Assessment & Plan:     # Acne vulgaris  -Continue tretinoin 0.05% cream nightly. The patient will apply pea size amount to the entire face, avoid areas around the eyes, corners of nose and mouth. Discussed side effects including photosensitivity and irritation.   -moisturize with a cream such as Cetaphil cream, Cera Ve Cream or Vanicream nightly and more PRN for dryness.    -Continue spironolactone 100 mg daily.  Advised to do a trial of decreasing the dosage to 50 mg daily for a while to see if this keeps her well-controlled.  If well-controlled okay to continue on 50 mg daily.  If she flares she will go back to 100 mg daily.  Okay to take the whole dosage at once. This is a medication used in high doses for blood pressure but in lower doses for acne due to its anti-male hormone effects. Main side effects are dizziness, menstrual spotting, breast tenderness, high potassium, and can be harmful to a fetus if you were to become pregnant. It is very important to avoid pregnancy while on this medication and to stop it immediately if you do become pregnant. Advised to stay well hydrated while on the medication and notify us if she were to feel heart palpitations. She is a young and healthy individual so we are not checking potassium levels regularly unless side effects occur.       # Verruca vulgaris, left lateral  heel. Counseled on natural history and viral etiology of this condition. Counseled that these are often recalcitrant to treatment. Discussed all treatment options that we offer as well as side effects of each. Advised that highest rates of success can be observed with combination monthly cryotherapy in office and home treatments with salicylic acid.   -Patient declines treatment today and will follow-up at a further date if she desires treatment of the lesion.    # Benign skin findings including:  cherry angioma, lentigines and benign nevi.   - No further intervention required. Patient to report changes.   - Patient reassured of the benign nature of these lesions.    #Signs and Symptoms of non-melanoma skin cancer and ABCDEs of melanoma reviewed with patient. Patient encouraged to perform monthly self skin exams and educated on how to perform them. UV precautions reviewed with patient. Patient was asked about new or changing moles/lesions on body.     #Reviewed Sunscreen: Apply 20 minutes prior to going outdoors and reapply every two hours, when wet or sweating. We recommend using an SPF 30 or higher, and to use one that is water resistant.       Follow-up:  1 for medication refills, 1 to 2 years for years for follow up full body skin exam, prn for new or changing lesions or new concerns    Kailey Nieto CNP  Dermatology     ____________________________________________    CC: Skin Check (FBSC, possible wart on foot)    HPI:  Ms. Bridgett Robin is a(n) 29 year old female who presents today as a return patient for a full body skin cancer screening. Patient has concerns today about a wart on the left heel.  She has had this for a while and wonders if she needs to treat it.  She also would like refills of spironolactone and tretinoin.  She has been using spironolactone 100 mg daily without side effects and tretinoin 0.05% cream which she is tolerating well.  She thinks her acne is well-controlled and would like to continue  on her current regimen.    Denies lightheadedness, dizziness, heart palpitations, headaches, breast tenderness or spotting between menstrual cycles. She is preventing pregnancy.       Patient is otherwise feeling well, without additional skin concerns.     Physical Exam:  Vitals: There were no vitals taken for this visit.  SKIN: Total skin excluding the genitalia areas was performed. The exam included the head/face, neck, both arms, chest, back, abdomen, both legs, digits, mons pubis, buttock and nails.   -1 acneiform papule on the right chin  -Left lateral heel, about a 4 to 5 mm verrucous papule with dot vessels interrupting skin lines  -A few 1-2mm red dome shaped symmetric papules scattered on the trunk  -Very few tan/brown flat round macules and raised papules scattered throughout trunk, extremities and head. No worrisome features for malignancy noted on examination.  -scattered tan, homogenous macules scattered on sun exposed areas of trunk, extremities and face.       - No other lesions of concern on areas examined.     Medications:  Current Outpatient Medications   Medication Sig Dispense Refill    escitalopram (LEXAPRO) 5 MG tablet       spironolactone (ALDACTONE) 50 MG tablet Take 2 tablets (100 mg) by mouth daily 180 tablet 3    tretinoin (RETIN-A) 0.05 % external cream Apply topically at bedtime 45 g 11     No current facility-administered medications for this visit.      Past Medical History:   There is no problem list on file for this patient.    No past medical history on file.    CC Referred Self, MD  No address on file on close of this encounter.

## 2024-04-24 NOTE — PATIENT INSTRUCTIONS
Patient Education       Proper skin care from [facility]:    -Eliminate harsh soaps as they strip the natural oils from the skin, often resulting in dry itchy skin ( i.e. Dial, Zest, Danish Spring)  -Use mild soaps such as Cetaphil or Dove Sensitive Skin in the shower. You do not need to use soap on arms, legs, and trunk every time you shower unless visibly soiled.   -Avoid hot or cold showers.  -After showering, lightly dry off and apply moisturizing within 2-3 minutes. This will help trap moisture in the skin.   -Aggressive use of a moisturizer at least 1-2 times a day to the entire body (including -Vanicream, Cetaphil, Aquaphor or Cerave) and moisturize hands after every washing.  -We recommend using moisturizers that come in a tub that needs to be scooped out, not a pump. This has more of an oil base. It will hold moisture in your skin much better than a water base moisturizer. The above recommended are non-pore clogging.      Wear a sunscreen with at least SPF 30 on your face, ears, neck and V of the chest daily. Wear sunscreen on other areas of the body if those areas are exposed to the sun throughout the day. Sunscreens can contain physical and/or chemical blockers. Physical blockers are less likely to clog pores, these include zinc oxide and titanium dioxide. Reapply every two hour and after swimming.     Sunscreen examples: https://www.ewg.org/sunscreen/    UV radiation  UVA radiation remains constant throughout the day and throughout the year. It is a longer wavelength than UVB and therefore penetrates deeper into the skin leading to immediate and delayed tanning, photoaging, and skin cancer. 70-80% of UVA and UVB radiation occurs between the hours of 10am-2pm.  UVB radiation  UVB radiation causes the most harmful effects and is more significant during the summer months. However, snow and ice can reflect UVB radiation leading to skin damage during the winter months as well. UVB radiation is  responsible for tanning, burning, inflammation, delayed erythema (pinkness), pigmentation (brown spots), and skin cancer.     I recommend self monthly full body exams and yearly full body exams with a dermatology provider. If you develop a new or changing lesion please follow up for examination. Most skin cancers are pink and scaly or pink and pearly. However, we do see blue/brown/black skin cancers.  Consider the ABCDEs of melanoma when giving yourself your monthly full body exam ( don't forget the groin, buttocks, feet, toes, etc). A-asymmetry, B-borders, C-color, D-diameter, E-elevation or evolving. If you see any of these changes please follow up in clinic. If you cannot see your back I recommend purchasing a hand held mirror to use with a larger wall mirror.       Checking for Skin Cancer  You can find cancer early by checking your skin each month. There are 3 kinds of skin cancer. They are melanoma, basal cell carcinoma, and squamous cell carcinoma. Doing monthly skin checks is the best way to find new marks or skin changes. Follow the instructions below for checking your skin.   The ABCDEs of checking moles for melanoma   Check your moles or growths for signs of melanoma using ABCDE:   Asymmetry: the sides of the mole or growth don t match  Border: the edges are ragged, notched, or blurred  Color: the color within the mole or growth varies  Diameter: the mole or growth is larger than 6 mm (size of a pencil eraser)  Evolving: the size, shape, or color of the mole or growth is changing (evolving is not shown in the images below)    Checking for other types of skin cancer  Basal cell carcinoma or squamous cell carcinoma have symptoms such as:     A spot or mole that looks different from all other marks on your skin  Changes in how an area feels, such as itching, tenderness, or pain  Changes in the skin's surface, such as oozing, bleeding, or scaliness  A sore that does not heal  New swelling or redness beyond  the border of a mole    Who s at risk?  Anyone can get skin cancer. But you are at greater risk if you have:   Fair skin, light-colored hair, or light-colored eyes  Many moles or abnormal moles on your skin  A history of sunburns from sunlight or tanning beds  A family history of skin cancer  A history of exposure to radiation or chemicals  A weakened immune system  If you have had skin cancer in the past, you are at risk for recurring skin cancer.   How to check your skin  Do your monthly skin checkups in front of a full-length mirror. Check all parts of your body, including your:   Head (ears, face, neck, and scalp)  Torso (front, back, and sides)  Arms (tops, undersides, upper, and lower armpits)  Hands (palms, backs, and fingers, including under the nails)  Buttocks and genitals  Legs (front, back, and sides)  Feet (tops, soles, toes, including under the nails, and between toes)  If you have a lot of moles, take digital photos of them each month. Make sure to take photos both up close and from a distance. These can help you see if any moles change over time.   Most skin changes are not cancer. But if you see any changes in your skin, call your doctor right away. Only he or she can diagnose a problem. If you have skin cancer, seeing your doctor can be the first step toward getting the treatment that could save your life.   [employer] last reviewed this educational content on 4/1/2019 2000-2020 [employer]. [address] [place] [address], [place] [address]. All rights reserved. This information is not intended as a substitute for professional medical care. Always follow your healthcare professional's instructions.       When should I call my doctor?  If you are worsening or not improving, please, contact us or seek urgent care as noted below.     Who should I call with questions (adults)?  [facility] (adult and pediatric): [phone]  [facility]  [place] (adult): [phone]  [facility] ([place], [place], [place] and [place]) [phone]  For urgent needs outside of business hours call the [facility] at [phone] and ask for the dermatology resident on call to be paged  If this is a medical emergency and you are unable to reach an ER, Call 911      If you need a prescription refill, please contact your pharmacy. Refills are approved or denied by our Physicians during normal business hours, Monday through Fridays  Per office policy, refills will not be granted if you have not been seen within the past year (or sooner depending on your child's condition)

## 2024-04-24 NOTE — LETTER
4/24/2024         RE: Bridgett Robin  [address] [address]        Dear Colleague,    Thank you for referring your patient, Bridgett Robin, to the [facility]. Please see a copy of my visit note below.    [facility] Dermatology Note  Encounter Date: Apr 24, 2024  Office Visit     Reviewed patients past medical history and pertinent chart review prior to patients visit today.     Dermatology Problem List:  Acne, well-controlled using spironolactone 100 mg daily and tretinoin 0.05% cream nightly  She had a mole removed from her back in high school, states this had normal pathology    Patient denies personal history of skin cancer or dysplastic nevi.    Grandmother had skin cancer in her face, type unknown.  No known family history of melanoma  ____________________________________________    Assessment & Plan:     # Acne vulgaris  -Continue tretinoin 0.05% cream nightly. The patient will apply pea size amount to the entire face, avoid areas around the eyes, corners of nose and mouth. Discussed side effects including photosensitivity and irritation.   -moisturize with a cream such as Cetaphil cream, Cera Ve Cream or Vanicream nightly and more PRN for dryness.    -Continue spironolactone 100 mg daily.  Advised to do a trial of decreasing the dosage to 50 mg daily for a while to see if this keeps her well-controlled.  If well-controlled okay to continue on 50 mg daily.  If she flares she will go back to 100 mg daily.  Okay to take the whole dosage at once. This is a medication used in high doses for blood pressure but in lower doses for acne due to its anti-male hormone effects. Main side effects are dizziness, menstrual spotting, breast tenderness, high potassium, and can be harmful to a fetus if you were to become pregnant. It is very important to avoid pregnancy while on this medication and to stop it immediately if you do become pregnant. Advised to  stay well hydrated while on the medication and notify us if she were to feel heart palpitations. She is a young and healthy individual so we are not checking potassium levels regularly unless side effects occur.       # Verruca vulgaris, left lateral heel. Counseled on natural history and viral etiology of this condition. Counseled that these are often recalcitrant to treatment. Discussed all treatment options that we offer as well as side effects of each. Advised that highest rates of success can be observed with combination monthly cryotherapy in office and home treatments with salicylic acid.   -Patient declines treatment today and will follow-up at a further date if she desires treatment of the lesion.    # Benign skin findings including:  cherry angioma, lentigines and benign nevi.   - No further intervention required. Patient to report changes.   - Patient reassured of the benign nature of these lesions.    #Signs and Symptoms of non-melanoma skin cancer and ABCDEs of melanoma reviewed with patient. Patient encouraged to perform monthly self skin exams and educated on how to perform them. UV precautions reviewed with patient. Patient was asked about new or changing moles/lesions on body.     #Reviewed Sunscreen: Apply 20 minutes prior to going outdoors and reapply every two hours, when wet or sweating. We recommend using an SPF 30 or higher, and to use one that is water resistant.       Follow-up:  1 for medication refills, 1 to 2 years for years for follow up full body skin exam, prn for new or changing lesions or new concerns    Kailey Nieto, [facility]  Dermatology     ____________________________________________    CC: Skin Check (FBSC, possible wart on foot)    HPI:  Ms. Bridgett Robin is a(n) 29 year old female who presents today as a return patient for a full body skin cancer screening. Patient has concerns today about a wart on the left heel.  She has had this for a while and wonders if she needs to treat it.   She also would like refills of spironolactone and tretinoin.  She has been using spironolactone 100 mg daily without side effects and tretinoin 0.05% cream which she is tolerating well.  She thinks her acne is well-controlled and would like to continue on her current regimen.    Denies lightheadedness, dizziness, heart palpitations, headaches, breast tenderness or spotting between menstrual cycles. She is preventing pregnancy.       Patient is otherwise feeling well, without additional skin concerns.     Physical Exam:  Vitals: There were no vitals taken for this visit.  SKIN: Total skin excluding the genitalia areas was performed. The exam included the head/face, neck, both arms, chest, back, abdomen, both legs, digits, mons pubis, buttock and nails.   -1 acneiform papule on the right chin  -Left lateral heel, about a 4 to 5 mm verrucous papule with dot vessels interrupting skin lines  -A few 1-2mm red dome shaped symmetric papules scattered on the trunk  -Very few tan/brown flat round macules and raised papules scattered throughout trunk, extremities and head. No worrisome features for malignancy noted on examination.  -scattered tan, homogenous macules scattered on sun exposed areas of trunk, extremities and face.       - No other lesions of concern on areas examined.     Medications:  Current Outpatient Medications   Medication Sig Dispense Refill     escitalopram (LEXAPRO) 5 MG tablet        spironolactone (ALDACTONE) 50 MG tablet Take 2 tablets (100 mg) by mouth daily 180 tablet 3     tretinoin (RETIN-A) 0.05 % external cream Apply topically at bedtime 45 g 11     No current facility-administered medications for this visit.      Past Medical History:   There is no problem list on file for this patient.    No past medical history on file.    CC Referred Self, MD  No address on file on close of this encounter.      Again, thank you for allowing me to participate in the care of your patient.         Sincerely,        MARTINEZ Mane CNP

## 2024-07-14 ENCOUNTER — HEALTH MAINTENANCE LETTER (OUTPATIENT)
Age: 29
End: 2024-07-14

## 2025-02-17 DIAGNOSIS — L70.0 ACNE VULGARIS: ICD-10-CM

## 2025-02-17 RX ORDER — SPIRONOLACTONE 50 MG/1
100 TABLET, FILM COATED ORAL DAILY
Qty: 180 TABLET | Refills: 3 | OUTPATIENT
Start: 2025-02-17